# Patient Record
Sex: FEMALE
[De-identification: names, ages, dates, MRNs, and addresses within clinical notes are randomized per-mention and may not be internally consistent; named-entity substitution may affect disease eponyms.]

---

## 2020-04-07 ENCOUNTER — NURSE TRIAGE (OUTPATIENT)
Dept: OTHER | Facility: CLINIC | Age: 40
End: 2020-04-07

## 2020-04-07 NOTE — TELEPHONE ENCOUNTER
Reason for Disposition   Wheezing is present    Answer Assessment - Initial Assessment Questions  1. ONSET: \"When did the cough begin? \"       March 26 or 28  2. SEVERITY: \"How bad is the cough today? \"       Was worse than it is now   3. RESPIRATORY DISTRESS: \"Describe your breathing. \"       Pt is wheezing right now  4. FEVER: \"Do you have a fever? \" If so, ask: \"What is your temperature, how was it measured, and when did it start? \"      Sweating alot  5. SPUTUM: \"Describe the color of your sputum\" (clear, white, yellow, green)      yellow  6. HEMOPTYSIS: \"Are you coughing up any blood? \" If so ask: \"How much? \" (flecks, streaks, tablespoons, etc.)      no  7. CARDIAC HISTORY: \"Do you have any history of heart disease? \" (e.g., heart attack, congestive heart failure)       no  8. LUNG HISTORY: \"Do you have any history of lung disease? \"  (e.g., pulmonary embolus, asthma, emphysema)      asthma  9. PE RISK FACTORS: \"Do you have a history of blood clots? \" (or: recent major surgery, recent prolonged travel, bedridden)      no  10. OTHER SYMPTOMS: \"Do you have any other symptoms? \" (e.g., runny nose, wheezing, chest pain)        Wheezing , body aches now. But pt seemed worse before , as she did have vomit, sore throat , sneezing, runny nose. She also has intermittent HA and sinus drainage. 11. PREGNANCY: \"Is there any chance you are pregnant? \" \"When was your last menstrual period? \"        no  12. TRAVEL: \"Have you traveled out of the country in the last month? \" (e.g., travel history, exposures)        no    Protocols used: COUGH - ACUTE PRODUCTIVE-ADULT-AH    Pt with symptoms as documented above. Pt informed of disposition. Pt educated on [employer], web site and/or [employer]. Pt educated on precautions/hand hygiene/ social distancing.